# Patient Record
Sex: FEMALE | Race: WHITE | NOT HISPANIC OR LATINO
[De-identification: names, ages, dates, MRNs, and addresses within clinical notes are randomized per-mention and may not be internally consistent; named-entity substitution may affect disease eponyms.]

---

## 2018-09-21 ENCOUNTER — APPOINTMENT (OUTPATIENT)
Dept: VASCULAR SURGERY | Facility: CLINIC | Age: 53
End: 2018-09-21
Payer: SELF-PAY

## 2018-09-21 VITALS
HEART RATE: 90 BPM | OXYGEN SATURATION: 98 % | WEIGHT: 116 LBS | BODY MASS INDEX: 19.33 KG/M2 | SYSTOLIC BLOOD PRESSURE: 133 MMHG | DIASTOLIC BLOOD PRESSURE: 84 MMHG | HEIGHT: 65 IN

## 2018-09-21 PROCEDURE — 99243 OFF/OP CNSLTJ NEW/EST LOW 30: CPT

## 2018-10-22 ENCOUNTER — APPOINTMENT (OUTPATIENT)
Dept: NEUROLOGY | Facility: CLINIC | Age: 53
End: 2018-10-22
Payer: COMMERCIAL

## 2018-10-22 VITALS
DIASTOLIC BLOOD PRESSURE: 84 MMHG | HEART RATE: 104 BPM | BODY MASS INDEX: 19.33 KG/M2 | TEMPERATURE: 97.9 F | SYSTOLIC BLOOD PRESSURE: 126 MMHG | WEIGHT: 116 LBS | HEIGHT: 65 IN | OXYGEN SATURATION: 100 %

## 2018-10-22 DIAGNOSIS — F17.200 NICOTINE DEPENDENCE, UNSPECIFIED, UNCOMPLICATED: ICD-10-CM

## 2018-10-22 DIAGNOSIS — Z87.448 PERSONAL HISTORY OF OTHER DISEASES OF URINARY SYSTEM: ICD-10-CM

## 2018-10-22 DIAGNOSIS — Z82.49 FAMILY HISTORY OF ISCHEMIC HEART DISEASE AND OTHER DISEASES OF THE CIRCULATORY SYSTEM: ICD-10-CM

## 2018-10-22 DIAGNOSIS — Z78.9 OTHER SPECIFIED HEALTH STATUS: ICD-10-CM

## 2018-10-22 DIAGNOSIS — Z87.19 PERSONAL HISTORY OF OTHER DISEASES OF THE DIGESTIVE SYSTEM: ICD-10-CM

## 2018-10-22 DIAGNOSIS — Z86.69 PERSONAL HISTORY OF OTHER DISEASES OF THE NERVOUS SYSTEM AND SENSE ORGANS: ICD-10-CM

## 2018-10-22 DIAGNOSIS — I51.9 HEART DISEASE, UNSPECIFIED: ICD-10-CM

## 2018-10-22 DIAGNOSIS — Z82.61 FAMILY HISTORY OF ARTHRITIS: ICD-10-CM

## 2018-10-22 DIAGNOSIS — Z82.5 FAMILY HISTORY OF ASTHMA AND OTHER CHRONIC LOWER RESPIRATORY DISEASES: ICD-10-CM

## 2018-10-22 DIAGNOSIS — Z84.1 FAMILY HISTORY OF DISORDERS OF KIDNEY AND URETER: ICD-10-CM

## 2018-10-22 DIAGNOSIS — Z98.891 HISTORY OF UTERINE SCAR FROM PREVIOUS SURGERY: ICD-10-CM

## 2018-10-22 DIAGNOSIS — F17.210 NICOTINE DEPENDENCE, CIGARETTES, UNCOMPLICATED: ICD-10-CM

## 2018-10-22 DIAGNOSIS — Z83.3 FAMILY HISTORY OF DIABETES MELLITUS: ICD-10-CM

## 2018-10-22 DIAGNOSIS — M79.606 PAIN IN LEG, UNSPECIFIED: ICD-10-CM

## 2018-10-22 DIAGNOSIS — M23.309 OTHER MENISCUS DERANGEMENTS, UNSPECIFIED MENISCUS, UNSPECIFIED KNEE: ICD-10-CM

## 2018-10-22 PROCEDURE — 99205 OFFICE O/P NEW HI 60 MIN: CPT

## 2018-10-23 LAB
ALBUMIN SERPL ELPH-MCNC: 4.5 G/DL
ALP BLD-CCNC: 66 U/L
ALT SERPL-CCNC: 18 U/L
ANION GAP SERPL CALC-SCNC: 13 MMOL/L
AST SERPL-CCNC: 24 U/L
BASOPHILS # BLD AUTO: 0.02 K/UL
BASOPHILS NFR BLD AUTO: 0.3 %
BILIRUB SERPL-MCNC: 0.3 MG/DL
BUN SERPL-MCNC: 8 MG/DL
CALCIUM SERPL-MCNC: 9.7 MG/DL
CHLORIDE SERPL-SCNC: 106 MMOL/L
CHOLEST SERPL-MCNC: 193 MG/DL
CHOLEST/HDLC SERPL: 2.1 RATIO
CO2 SERPL-SCNC: 25 MMOL/L
CREAT SERPL-MCNC: 0.63 MG/DL
CRP SERPL-MCNC: 0.82 MG/DL
EOSINOPHIL # BLD AUTO: 0.3 K/UL
EOSINOPHIL NFR BLD AUTO: 4.5 %
ERYTHROCYTE [SEDIMENTATION RATE] IN BLOOD BY WESTERGREN METHOD: 18 MM/HR
ESTIMATED AVERAGE GLUCOSE: 105 MG/DL
FOLATE SERPL-MCNC: 7.3 NG/ML
GLIADIN IGA SER QL: <5 UNITS
GLIADIN IGG SER QL: <5 UNITS
GLIADIN PEPTIDE IGA SER-ACNC: NEGATIVE
GLIADIN PEPTIDE IGG SER-ACNC: NEGATIVE
GLUCOSE SERPL-MCNC: 123 MG/DL
HBA1C MFR BLD HPLC: 5.3 %
HCT VFR BLD CALC: 43.3 %
HCV AB SER QL: NONREACTIVE
HCV S/CO RATIO: 0.2 S/CO
HDLC SERPL-MCNC: 90 MG/DL
HGB BLD-MCNC: 14.3 G/DL
HIV1+2 AB SPEC QL IA.RAPID: NONREACTIVE
IMM GRANULOCYTES NFR BLD AUTO: 0.3 %
LDLC SERPL CALC-MCNC: 88 MG/DL
LYMPHOCYTES # BLD AUTO: 1.83 K/UL
LYMPHOCYTES NFR BLD AUTO: 27.6 %
MAN DIFF?: NORMAL
MCHC RBC-ENTMCNC: 33 GM/DL
MCHC RBC-ENTMCNC: 33.9 PG
MCV RBC AUTO: 102.6 FL
MONOCYTES # BLD AUTO: 0.56 K/UL
MONOCYTES NFR BLD AUTO: 8.5 %
NEUTROPHILS # BLD AUTO: 3.89 K/UL
NEUTROPHILS NFR BLD AUTO: 58.8 %
PLATELET # BLD AUTO: 337 K/UL
POTASSIUM SERPL-SCNC: 3.9 MMOL/L
PROT SERPL-MCNC: 7.2 G/DL
RBC # BLD: 4.22 M/UL
RBC # FLD: 13.6 %
RHEUMATOID FACT SER QL: <10 IU/ML
SODIUM SERPL-SCNC: 144 MMOL/L
TRIGL SERPL-MCNC: 73 MG/DL
TSH SERPL-ACNC: 1.02 UIU/ML
VIT B12 SERPL-MCNC: 795 PG/ML
WBC # FLD AUTO: 6.62 K/UL

## 2018-10-24 LAB
ACE BLD-CCNC: 33 U/L
ANA SER IF-ACNC: NEGATIVE
ENA RNP AB SER IA-ACNC: 0.3 AL
ENA SM AB SER IA-ACNC: <0.2 AL
ENA SS-A AB SER IA-ACNC: <0.2 AL
ENA SS-B AB SER IA-ACNC: <0.2 AL
TTG IGA SER IA-ACNC: <5 UNITS
TTG IGA SER-ACNC: NEGATIVE
TTG IGG SER IA-ACNC: <5 UNITS
TTG IGG SER IA-ACNC: NEGATIVE

## 2018-10-26 LAB
CRYOGLOB SERPL-MCNC: NEGATIVE
METHYLMALONATE SERPL-SCNC: 181 NMOL/L
VIT B1 SERPL-MCNC: 110.8 NMOL/L

## 2018-11-05 LAB — HU AB SER QL: NORMAL

## 2019-01-11 ENCOUNTER — APPOINTMENT (OUTPATIENT)
Dept: NEUROLOGY | Facility: CLINIC | Age: 54
End: 2019-01-11
Payer: COMMERCIAL

## 2019-01-11 VITALS — OXYGEN SATURATION: 99 % | SYSTOLIC BLOOD PRESSURE: 124 MMHG | HEART RATE: 92 BPM | DIASTOLIC BLOOD PRESSURE: 86 MMHG

## 2019-01-11 VITALS — HEIGHT: 65 IN | WEIGHT: 120 LBS | BODY MASS INDEX: 19.99 KG/M2

## 2019-01-11 PROCEDURE — 95912 NRV CNDJ TEST 11-12 STUDIES: CPT

## 2019-01-11 PROCEDURE — 99214 OFFICE O/P EST MOD 30 MIN: CPT | Mod: 25

## 2019-01-11 PROCEDURE — 95885 MUSC TST DONE W/NERV TST LIM: CPT | Mod: 59

## 2019-01-11 RX ORDER — DOCUSATE SODIUM - SENNOSIDES 50; 8.6 MG/1; MG/1
8.6-5 TABLET, FILM COATED ORAL
Refills: 0 | Status: ACTIVE | COMMUNITY

## 2019-01-11 RX ORDER — CIMETIDINE 200 MG
200 TABLET ORAL
Refills: 0 | Status: ACTIVE | COMMUNITY

## 2019-01-11 NOTE — PHYSICAL EXAM
[General Appearance - Alert] : alert [General Appearance - In No Acute Distress] : in no acute distress [Oriented To Time, Place, And Person] : oriented to person, place, and time [Impaired Insight] : insight and judgment were intact [Affect] : the affect was normal [Concentration Intact] : normal concentrating ability [Fluency] : fluency intact [Comprehension] : comprehension intact [Past History] : adequate knowledge of personal past history [Vocabulary] : adequate range of vocabulary [Cranial Nerves Optic (II)] : visual acuity intact bilaterally,  visual fields full to confrontation, pupils equal round and reactive to light [Cranial Nerves Oculomotor (III)] : extraocular motion intact [Cranial Nerves Trigeminal (V)] : facial sensation intact symmetrically [Cranial Nerves Facial (VII)] : face symmetrical [Cranial Nerves Vestibulocochlear (VIII)] : hearing was intact bilaterally [Cranial Nerves Glossopharyngeal (IX)] : tongue and palate midline [Cranial Nerves Accessory (XI - Cranial And Spinal)] : head turning and shoulder shrug symmetric [Cranial Nerves Hypoglossal (XII)] : there was no tongue deviation with protrusion [Motor Tone] : muscle tone was normal in all four extremities [Motor Strength] : muscle strength was normal in all four extremities [Involuntary Movements] : no involuntary movements were seen [No Muscle Atrophy] : normal bulk in all four extremities [Sensation Vibration Decrease] : vibration was intact [Proprioception] : proprioception was intact [Abnormal Walk] : normal gait [Balance] : balance was intact [Past-pointing] : there was no past-pointing [Tremor] : no tremor present [Coordination - Dysmetria Impaired Finger-to-Nose Bilateral] : not present [2+] : Ankle jerk left 2+ [FreeTextEntry7] : diminished cold sensation in length-dependent manner in UE and LE b/l; decreased sensation right anterior knee [FreeTextEntry1] : blanching erythema feet > hands b/l, cold to touch

## 2019-01-11 NOTE — HISTORY OF PRESENT ILLNESS
[FreeTextEntry1] : Symptoms are largely unchanged. SHe is trying to go off gralise to see if pain remains stable. She again reports cold and red hands and feet prior to surgery, but thinks it got somewhat worse after. Surgery was 1 year ago.

## 2019-01-11 NOTE — PROCEDURE
[FreeTextEntry1] : Nerve Conduction and Electromyography Report [FreeTextEntry3] : Electro Physiologic Findings:\par \par The superficial fibular SNAPs were normal, with borderline velocities likely related to the feet being slightly cold. The sural SNAPs were normal bilaterally and symmetric. The left saphenous SNAP was present while the right was absent. The fibular and tibial motor responses, including F-wave latencies, were normal bilaterally and largely symmetric. The tibial H-reflexes were normal and symmetric. \par \par Needle electromyography was performed on the bilateral gastrocnemius, tibialis anterior, and S1 radiculopathies. There was mild active denervation in the left S1 radiculopathy; the other muscles tested were unremarkable. \par \par Clinical Electrophysiological Impression: \par \par This electrodiagnostic study demonstrated a mild left S1 radiculopathy and right saphenous neuropathy. There was no evidence of polyneuropathy or fibular nerve entrapment on this study.

## 2019-01-11 NOTE — ASSESSMENT
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 \par \par At this point i believe she had either a pre-existing small fiber neuropathy or erythromelalgia prior to surgery and then developed a CRPS type exacerbation. \par \par Trial aspirin 81mg BID for possible erythromelalgia. Can taper gralise, if pain gets worse go back on. Return in 6-8 weeks. \par \par See separate procedure note for full results of study.

## 2019-03-14 ENCOUNTER — APPOINTMENT (OUTPATIENT)
Dept: NEUROLOGY | Facility: CLINIC | Age: 54
End: 2019-03-14
Payer: COMMERCIAL

## 2019-03-14 VITALS
WEIGHT: 118 LBS | OXYGEN SATURATION: 97 % | HEART RATE: 93 BPM | SYSTOLIC BLOOD PRESSURE: 133 MMHG | DIASTOLIC BLOOD PRESSURE: 83 MMHG | HEIGHT: 65 IN | BODY MASS INDEX: 19.66 KG/M2

## 2019-03-14 PROCEDURE — 99214 OFFICE O/P EST MOD 30 MIN: CPT

## 2019-03-14 NOTE — HISTORY OF PRESENT ILLNESS
[FreeTextEntry1] : Since last visit no significant change in symptoms. Her back is bothering her a bit more, she thinks because she's stopped taking celebrex. Aspirin hasn't helped much. She is still not taking gralise.  Aspirin has not helpe \par She also feels tightness and swelling in her hands, she thinks in the joints but is not sure. She has seen a rheumatologist in the past without clear diagnosis. \par Mother has lupus.

## 2019-03-14 NOTE — PHYSICAL EXAM
[General Appearance - Alert] : alert [General Appearance - In No Acute Distress] : in no acute distress [Oriented To Time, Place, And Person] : oriented to person, place, and time [Impaired Insight] : insight and judgment were intact [Affect] : the affect was normal [Concentration Intact] : normal concentrating ability [Fluency] : fluency intact [Comprehension] : comprehension intact [Past History] : adequate knowledge of personal past history [Vocabulary] : adequate range of vocabulary [Cranial Nerves Optic (II)] : visual acuity intact bilaterally,  visual fields full to confrontation, pupils equal round and reactive to light [Cranial Nerves Oculomotor (III)] : extraocular motion intact [Cranial Nerves Trigeminal (V)] : facial sensation intact symmetrically [Cranial Nerves Facial (VII)] : face symmetrical [Cranial Nerves Vestibulocochlear (VIII)] : hearing was intact bilaterally [Cranial Nerves Glossopharyngeal (IX)] : tongue and palate midline [Cranial Nerves Accessory (XI - Cranial And Spinal)] : head turning and shoulder shrug symmetric [Cranial Nerves Hypoglossal (XII)] : there was no tongue deviation with protrusion [Motor Tone] : muscle tone was normal in all four extremities [Motor Strength] : muscle strength was normal in all four extremities [Involuntary Movements] : no involuntary movements were seen [No Muscle Atrophy] : normal bulk in all four extremities [Sensation Vibration Decrease] : vibration was intact [Proprioception] : proprioception was intact [Abnormal Walk] : normal gait [Balance] : balance was intact [2+] : Ankle jerk left 2+ [Past-pointing] : there was no past-pointing [Tremor] : no tremor present [Coordination - Dysmetria Impaired Finger-to-Nose Bilateral] : not present [FreeTextEntry7] : diminished cold sensation in length-dependent manner in UE and LE b/l; decreased sensation right anterior knee [FreeTextEntry1] : blanching erythema feet > hands b/l, cold to touch

## 2019-03-14 NOTE — ASSESSMENT
[FreeTextEntry1] : Trial of verapamil for vasomotor dysfunction from small fiber neuropathy\par Discussed skin biopsy but not likely to be helpful at this time - deferred\par Stop aspirin\par Return in 2-3 months

## 2019-06-06 ENCOUNTER — TRANSCRIPTION ENCOUNTER (OUTPATIENT)
Age: 54
End: 2019-06-06

## 2019-06-06 ENCOUNTER — APPOINTMENT (OUTPATIENT)
Dept: NEUROLOGY | Facility: CLINIC | Age: 54
End: 2019-06-06
Payer: COMMERCIAL

## 2019-06-06 VITALS
WEIGHT: 121 LBS | TEMPERATURE: 98.2 F | HEART RATE: 91 BPM | HEIGHT: 65 IN | SYSTOLIC BLOOD PRESSURE: 143 MMHG | DIASTOLIC BLOOD PRESSURE: 86 MMHG | BODY MASS INDEX: 20.16 KG/M2 | OXYGEN SATURATION: 97 %

## 2019-06-06 VITALS — DIASTOLIC BLOOD PRESSURE: 84 MMHG | SYSTOLIC BLOOD PRESSURE: 138 MMHG

## 2019-06-06 DIAGNOSIS — G57.81 OTHER SPECIFIED MONONEUROPATHIES OF RIGHT LOWER LIMB: ICD-10-CM

## 2019-06-06 PROCEDURE — 99214 OFFICE O/P EST MOD 30 MIN: CPT

## 2019-06-06 RX ORDER — ASPIRIN 81 MG
81 TABLET,CHEWABLE ORAL
Refills: 0 | Status: DISCONTINUED | COMMUNITY
End: 2019-06-06

## 2019-06-06 NOTE — HISTORY OF PRESENT ILLNESS
[FreeTextEntry1] : She thinks that her symptoms have been better in the past couple of months since she started verapamil, although she is not 100% sure, and she believes it is causing a headache. \par \par 10 pt review of systems performed and reviewed with patient\par Past medical history, surgical history, social history, and family history reviewed with patient\par See scanned document for details\par

## 2019-06-06 NOTE — ASSESSMENT
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

## 2019-06-06 NOTE — PHYSICAL EXAM
[General Appearance - Alert] : alert [General Appearance - In No Acute Distress] : in no acute distress [Oriented To Time, Place, And Person] : oriented to person, place, and time [Impaired Insight] : insight and judgment were intact [Affect] : the affect was normal [Fluency] : fluency intact [Concentration Intact] : normal concentrating ability [Comprehension] : comprehension intact [Past History] : adequate knowledge of personal past history [Vocabulary] : adequate range of vocabulary [Cranial Nerves Oculomotor (III)] : extraocular motion intact [Cranial Nerves Trigeminal (V)] : facial sensation intact symmetrically [Cranial Nerves Optic (II)] : visual acuity intact bilaterally,  visual fields full to confrontation, pupils equal round and reactive to light [Cranial Nerves Facial (VII)] : face symmetrical [Cranial Nerves Vestibulocochlear (VIII)] : hearing was intact bilaterally [Cranial Nerves Glossopharyngeal (IX)] : tongue and palate midline [Cranial Nerves Hypoglossal (XII)] : there was no tongue deviation with protrusion [Cranial Nerves Accessory (XI - Cranial And Spinal)] : head turning and shoulder shrug symmetric [Motor Tone] : muscle tone was normal in all four extremities [Motor Strength] : muscle strength was normal in all four extremities [No Muscle Atrophy] : normal bulk in all four extremities [Involuntary Movements] : no involuntary movements were seen [Sensation Vibration Decrease] : vibration was intact [Proprioception] : proprioception was intact [Abnormal Walk] : normal gait [Balance] : balance was intact [2+] : Ankle jerk left 2+ [Past-pointing] : there was no past-pointing [Tremor] : no tremor present [Coordination - Dysmetria Impaired Finger-to-Nose Bilateral] : not present [FreeTextEntry7] : diminished cold sensation in length-dependent manner in UE and LE b/l; decreased sensation right anterior knee [FreeTextEntry1] : blanching erythema feet > hands b/l, cold to touch

## 2019-10-15 ENCOUNTER — APPOINTMENT (OUTPATIENT)
Dept: NEUROLOGY | Facility: CLINIC | Age: 54
End: 2019-10-15
Payer: COMMERCIAL

## 2019-10-15 VITALS
HEIGHT: 65 IN | SYSTOLIC BLOOD PRESSURE: 133 MMHG | HEART RATE: 93 BPM | DIASTOLIC BLOOD PRESSURE: 84 MMHG | WEIGHT: 124 LBS | OXYGEN SATURATION: 97 % | TEMPERATURE: 97.9 F | BODY MASS INDEX: 20.66 KG/M2

## 2019-10-15 LAB
CRP SERPL-MCNC: <0.1 MG/DL
RHEUMATOID FACT SER QL: <10 IU/ML

## 2019-10-15 PROCEDURE — 99214 OFFICE O/P EST MOD 30 MIN: CPT

## 2019-10-16 LAB
ALBUMIN MFR SERPL ELPH: 64.9 %
ALBUMIN SERPL-MCNC: 4.5 G/DL
ALBUMIN/GLOB SERPL: 1.8 RATIO
ALPHA1 GLOB MFR SERPL ELPH: 4.3 %
ALPHA1 GLOB SERPL ELPH-MCNC: 0.3 G/DL
ALPHA2 GLOB MFR SERPL ELPH: 10.2 %
ALPHA2 GLOB SERPL ELPH-MCNC: 0.7 G/DL
ANA SER IF-ACNC: NEGATIVE
B-GLOBULIN MFR SERPL ELPH: 10.4 %
B-GLOBULIN SERPL ELPH-MCNC: 0.7 G/DL
CCP AB SER IA-ACNC: <8 UNITS
DSDNA AB SER-ACNC: <12 IU/ML
ENA RNP AB SER IA-ACNC: 0.4 AL
ENA SM AB SER IA-ACNC: <0.2 AL
ENA SS-A AB SER IA-ACNC: <0.2 AL
ENA SS-B AB SER IA-ACNC: <0.2 AL
ERYTHROCYTE [SEDIMENTATION RATE] IN BLOOD BY WESTERGREN METHOD: 9 MM/HR
GAMMA GLOB FLD ELPH-MCNC: 0.7 G/DL
GAMMA GLOB MFR SERPL ELPH: 10.2 %
INTERPRETATION SERPL IEP-IMP: NORMAL
M PROTEIN SPEC IFE-MCNC: NORMAL
PROT SERPL-MCNC: 7 G/DL
PROT SERPL-MCNC: 7 G/DL
RF+CCP IGG SER-IMP: NEGATIVE

## 2019-10-16 NOTE — ASSESSMENT
[FreeTextEntry1] : Repeat autoimmune / inflammatory markers today - discussed\par Recommend she take verapamil regularly for 1-2 weeks to see if there is any benefit, if not she can stop it - discussed\par Return in 6 months

## 2019-10-16 NOTE — PHYSICAL EXAM
[General Appearance - In No Acute Distress] : in no acute distress [General Appearance - Alert] : alert [Oriented To Time, Place, And Person] : oriented to person, place, and time [Impaired Insight] : insight and judgment were intact [Concentration Intact] : normal concentrating ability [Affect] : the affect was normal [Fluency] : fluency intact [Comprehension] : comprehension intact [Past History] : adequate knowledge of personal past history [Cranial Nerves Optic (II)] : visual acuity intact bilaterally,  visual fields full to confrontation, pupils equal round and reactive to light [Cranial Nerves Oculomotor (III)] : extraocular motion intact [Vocabulary] : adequate range of vocabulary [Cranial Nerves Facial (VII)] : face symmetrical [Cranial Nerves Trigeminal (V)] : facial sensation intact symmetrically [Cranial Nerves Glossopharyngeal (IX)] : tongue and palate midline [Cranial Nerves Vestibulocochlear (VIII)] : hearing was intact bilaterally [Cranial Nerves Accessory (XI - Cranial And Spinal)] : head turning and shoulder shrug symmetric [Cranial Nerves Hypoglossal (XII)] : there was no tongue deviation with protrusion [Involuntary Movements] : no involuntary movements were seen [Motor Strength] : muscle strength was normal in all four extremities [Motor Tone] : muscle tone was normal in all four extremities [No Muscle Atrophy] : normal bulk in all four extremities [Sensation Vibration Decrease] : vibration was intact [Proprioception] : proprioception was intact [Abnormal Walk] : normal gait [Balance] : balance was intact [2+] : Ankle jerk left 2+ [Past-pointing] : there was no past-pointing [Tremor] : no tremor present [Coordination - Dysmetria Impaired Finger-to-Nose Bilateral] : not present [FreeTextEntry7] : diminished cold sensation in length-dependent manner in UE and LE b/l; decreased sensation right anterior knee [FreeTextEntry1] : blanching erythema feet > hands b/l, cold to touch

## 2019-10-16 NOTE — HISTORY OF PRESENT ILLNESS
[FreeTextEntry1] : Last seen 4 months ago\par Symptoms are about the same - not taking verapamil consistently \par Reviewed records she brought - RF and CCP negative in 2017\par She reports testing positive for lyme disease in the past, but when these symptoms started in 2014 she went to [facility] and was told she did not have it.

## 2019-11-11 ENCOUNTER — TRANSCRIPTION ENCOUNTER (OUTPATIENT)
Age: 54
End: 2019-11-11

## 2019-11-11 ENCOUNTER — RX RENEWAL (OUTPATIENT)
Age: 54
End: 2019-11-11

## 2019-11-22 ENCOUNTER — MEDICATION RENEWAL (OUTPATIENT)
Age: 54
End: 2019-11-22

## 2020-02-28 ENCOUNTER — TRANSCRIPTION ENCOUNTER (OUTPATIENT)
Age: 55
End: 2020-02-28

## 2020-03-19 ENCOUNTER — TRANSCRIPTION ENCOUNTER (OUTPATIENT)
Age: 55
End: 2020-03-19

## 2020-04-14 ENCOUNTER — APPOINTMENT (OUTPATIENT)
Dept: NEUROLOGY | Facility: CLINIC | Age: 55
End: 2020-04-14
Payer: COMMERCIAL

## 2020-04-14 DIAGNOSIS — M54.17 RADICULOPATHY, LUMBOSACRAL REGION: ICD-10-CM

## 2020-04-14 DIAGNOSIS — L53.9 ERYTHEMATOUS CONDITION, UNSPECIFIED: ICD-10-CM

## 2020-04-14 PROCEDURE — 99214 OFFICE O/P EST MOD 30 MIN: CPT | Mod: 95

## 2020-04-14 NOTE — ASSESSMENT
[FreeTextEntry1] : She may have had COVID but with a false negative test - can get serology testing when it is available\par Continue verapamil\par Left elbow brace at night\par Return for EMG - will look for ulnar entrapment and radiculoapthy

## 2020-04-14 NOTE — HISTORY OF PRESENT ILLNESS
[Home] : at home, [unfilled] , at the time of the visit. [Medical Office: ([facility])___] : at the medical office located in  [Patient] : the patient [Self] : self [FreeTextEntry1] : She tested negative for COVID 19, also negative for RVP\par She continued to have fever until a few days \par She had a "weird taste" in her mouth for a couple of weeks, as well as chest pressure\par \par She still has tingling in the left 4th and 5th fingers on the left - better after changing her sleeping position and keeping her arms straight \par She is going to have an MRI of the right elbow next week \par \par She's still taking verapamil - she thinks it does help b/c pain is worse when she doesn't take it \par \par She also complains of pain behind the left knee going down to the bottom of the left foot

## 2020-05-04 ENCOUNTER — TRANSCRIPTION ENCOUNTER (OUTPATIENT)
Age: 55
End: 2020-05-04

## 2020-05-21 ENCOUNTER — APPOINTMENT (OUTPATIENT)
Dept: NEUROLOGY | Facility: CLINIC | Age: 55
End: 2020-05-21
Payer: COMMERCIAL

## 2020-05-21 VITALS
TEMPERATURE: 97.9 F | OXYGEN SATURATION: 97 % | HEART RATE: 87 BPM | SYSTOLIC BLOOD PRESSURE: 151 MMHG | DIASTOLIC BLOOD PRESSURE: 93 MMHG

## 2020-05-21 VITALS — WEIGHT: 120 LBS | BODY MASS INDEX: 19.99 KG/M2 | HEIGHT: 65 IN

## 2020-05-21 VITALS — SYSTOLIC BLOOD PRESSURE: 150 MMHG | DIASTOLIC BLOOD PRESSURE: 88 MMHG

## 2020-05-21 DIAGNOSIS — G56.22 LESION OF ULNAR NERVE, LEFT UPPER LIMB: ICD-10-CM

## 2020-05-21 DIAGNOSIS — G57.02 LESION OF SCIATIC NERVE, LEFT LOWER LIMB: ICD-10-CM

## 2020-05-21 PROCEDURE — 95913 NRV CNDJ TEST 13/> STUDIES: CPT

## 2020-05-21 PROCEDURE — 95885 MUSC TST DONE W/NERV TST LIM: CPT

## 2020-05-21 PROCEDURE — 99214 OFFICE O/P EST MOD 30 MIN: CPT

## 2020-05-23 NOTE — PROCEDURE
[FreeTextEntry1] : Nerve Conduction and Electromyography Report [FreeTextEntry3] : Electro Physiologic Findings:  Limb temperature was monitored and maintained at approximately 30 – 34° C in the lower extremities, and 32 – 36° C in the upper extremities.  The superficial fibular SNAP amplitudes were normal bilaterally and symmetric; there was mild slowing on the left. The left sural SNAP amplitude was about 30% that of the right side with normal velocity bilaterally. The left ulnar SNAP amplitude was just over 50% that of the right side. The median SNAPS were normal bilaterally and symmetric.   The left fibular and tibial CMAP amplitudes were about 50% that of the right side, and there was mild slowing of the left fibular nerve across the fibular head without conduction block. The left ulnar motor response was mildly slow across the elbow without conduction block; the CMAP amplitudes were normal and symmetric. The median motor responses were also normal and symmetric. The bilateral fibular, tibial, median and ulnar F-wave latencies were normal and symmetric.   Needle electromyography was performed on select left lower extremity L4-S2 appendicular muscles and the left L4/5 and L5/S1 paraspinals. There was a mildly neurogenic firing pattern in the left biceps femoris; otherwise all muscles tested were normal without evidence of active or chronic denervation.   Clinical Electrophysiological Impression:    This electrodiagnostic study demonstrated a mild left ulnar nerve entrapment at the elbow with mild distal sensory axon loss, consistent with the patient’s symptoms of numbness/tingling down the medial left forearm to the 4th and 5th fingers.   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 However there was no definite denervation in any of the sciatic nerve-innervated muscles that were tested. An asymmetric polyneuropathy is also possible, but less likely given the distribution of the patient’s symptoms.

## 2020-05-24 ENCOUNTER — TRANSCRIPTION ENCOUNTER (OUTPATIENT)
Age: 55
End: 2020-05-24

## 2020-05-24 PROBLEM — G56.22 ENTRAPMENT OF LEFT ULNAR NERVE: Status: ACTIVE | Noted: 2020-05-04

## 2020-05-24 PROBLEM — G57.02 NEUROPATHY OF LEFT SCIATIC NERVE: Status: ACTIVE | Noted: 2020-05-24

## 2020-05-24 NOTE — PHYSICAL EXAM
[General Appearance - Alert] : alert [General Appearance - In No Acute Distress] : in no acute distress [Oriented To Time, Place, And Person] : oriented to person, place, and time [Impaired Insight] : insight and judgment were intact [Affect] : the affect was normal [Concentration Intact] : normal concentrating ability [Fluency] : fluency intact [Comprehension] : comprehension intact [Past History] : adequate knowledge of personal past history [Vocabulary] : adequate range of vocabulary [Cranial Nerves Optic (II)] : visual acuity intact bilaterally,  visual fields full to confrontation, pupils equal round and reactive to light [Cranial Nerves Oculomotor (III)] : extraocular motion intact [Cranial Nerves Trigeminal (V)] : facial sensation intact symmetrically [Cranial Nerves Facial (VII)] : face symmetrical [Cranial Nerves Vestibulocochlear (VIII)] : hearing was intact bilaterally [Cranial Nerves Glossopharyngeal (IX)] : tongue and palate midline [Cranial Nerves Accessory (XI - Cranial And Spinal)] : head turning and shoulder shrug symmetric [Cranial Nerves Hypoglossal (XII)] : there was no tongue deviation with protrusion [Motor Tone] : muscle tone was normal in all four extremities [Motor Strength] : muscle strength was normal in all four extremities [No Muscle Atrophy] : normal bulk in all four extremities [Involuntary Movements] : no involuntary movements were seen [Sensation Vibration Decrease] : vibration was intact [Proprioception] : proprioception was intact [Abnormal Walk] : normal gait [Balance] : balance was intact [Past-pointing] : there was no past-pointing [Tremor] : no tremor present [Coordination - Dysmetria Impaired Finger-to-Nose Bilateral] : not present [2+] : Ankle jerk left 2+ [FreeTextEntry7] : diminished cold sensation in length-dependent manner in UE and LE b/l; decreased sensation right anterior knee [FreeTextEntry1] : 1+ DP pulses b/l

## 2020-05-24 NOTE — HISTORY OF PRESENT ILLNESS
[FreeTextEntry1] : Symptoms unchanged - still has tingling down left arm to 4th and 5th fingers\par Also pain in left gluteal / upper posterior thigh region radiating down the leg to the heel \par Feet are getting more red, may be warming weather\par Thinks verapamil is helping

## 2020-05-24 NOTE — ASSESSMENT
[FreeTextEntry1] : NCS/EMG consistent with mild left ulnar nerve entrapment at elbow, and partial left sciatic neuropathy vs. asymmetric polyneuropathy (less likely)\par Recommend regular nocturnal elbow bracing. Will arrange for MRI of left sciatic nerve from gluteal region down to knee.\par \par See separate procedure note for full results of study.

## 2020-05-24 NOTE — PHYSICAL EXAM
[General Appearance - Alert] : alert [General Appearance - In No Acute Distress] : in no acute distress [Oriented To Time, Place, And Person] : oriented to person, place, and time [Impaired Insight] : insight and judgment were intact [Affect] : the affect was normal [Concentration Intact] : normal concentrating ability [Fluency] : fluency intact [Comprehension] : comprehension intact [Past History] : adequate knowledge of personal past history [Vocabulary] : adequate range of vocabulary [Cranial Nerves Optic (II)] : visual acuity intact bilaterally,  visual fields full to confrontation, pupils equal round and reactive to light [Cranial Nerves Oculomotor (III)] : extraocular motion intact [Cranial Nerves Trigeminal (V)] : facial sensation intact symmetrically [Cranial Nerves Facial (VII)] : face symmetrical [Cranial Nerves Vestibulocochlear (VIII)] : hearing was intact bilaterally [Cranial Nerves Glossopharyngeal (IX)] : tongue and palate midline [Cranial Nerves Accessory (XI - Cranial And Spinal)] : head turning and shoulder shrug symmetric [Cranial Nerves Hypoglossal (XII)] : there was no tongue deviation with protrusion [Motor Tone] : muscle tone was normal in all four extremities [Motor Strength] : muscle strength was normal in all four extremities [Involuntary Movements] : no involuntary movements were seen [No Muscle Atrophy] : normal bulk in all four extremities [Sensation Vibration Decrease] : vibration was intact [Proprioception] : proprioception was intact [Abnormal Walk] : normal gait [Balance] : balance was intact [Past-pointing] : there was no past-pointing [Tremor] : no tremor present [Coordination - Dysmetria Impaired Finger-to-Nose Bilateral] : not present [FreeTextEntry7] : diminished cold sensation in length-dependent manner in UE and LE b/l; decreased sensation right anterior knee [2+] : Ankle jerk left 2+ [FreeTextEntry1] : 1+ DP pulses b/l

## 2020-06-01 ENCOUNTER — TRANSCRIPTION ENCOUNTER (OUTPATIENT)
Age: 55
End: 2020-06-01

## 2020-07-10 ENCOUNTER — RESULT REVIEW (OUTPATIENT)
Age: 55
End: 2020-07-10

## 2020-07-10 ENCOUNTER — OUTPATIENT (OUTPATIENT)
Dept: OUTPATIENT SERVICES | Facility: HOSPITAL | Age: 55
LOS: 1 days | End: 2020-07-10

## 2020-07-10 ENCOUNTER — APPOINTMENT (OUTPATIENT)
Dept: MRI IMAGING | Facility: CLINIC | Age: 55
End: 2020-07-10
Payer: COMMERCIAL

## 2020-07-10 PROCEDURE — 73718 MRI LOWER EXTREMITY W/O DYE: CPT | Mod: 26,LT

## 2020-11-26 ENCOUNTER — EMERGENCY (EMERGENCY)
Facility: HOSPITAL | Age: 55
LOS: 1 days | Discharge: HOME | End: 2020-11-26
Admitting: EMERGENCY MEDICINE
Payer: COMMERCIAL

## 2020-11-26 VITALS
SYSTOLIC BLOOD PRESSURE: 146 MMHG | OXYGEN SATURATION: 98 % | HEART RATE: 86 BPM | RESPIRATION RATE: 18 BRPM | TEMPERATURE: 98 F | DIASTOLIC BLOOD PRESSURE: 79 MMHG

## 2020-11-26 PROCEDURE — 12001 RPR S/N/AX/GEN/TRNK 2.5CM/<: CPT

## 2020-11-26 PROCEDURE — 99283 EMERGENCY DEPT VISIT LOW MDM: CPT | Mod: 25

## 2020-11-27 PROCEDURE — 90715 TDAP VACCINE 7 YRS/> IM: CPT

## 2020-11-27 PROCEDURE — 99283 EMERGENCY DEPT VISIT LOW MDM: CPT | Mod: 25

## 2020-11-27 PROCEDURE — 90471 IMMUNIZATION ADMIN: CPT

## 2020-11-27 PROCEDURE — 12001 RPR S/N/AX/GEN/TRNK 2.5CM/<: CPT

## 2020-11-27 RX ORDER — TETANUS TOXOID, REDUCED DIPHTHERIA TOXOID AND ACELLULAR PERTUSSIS VACCINE, ADSORBED 5; 2.5; 8; 8; 2.5 [IU]/.5ML; [IU]/.5ML; UG/.5ML; UG/.5ML; UG/.5ML
0.5 SUSPENSION INTRAMUSCULAR ONCE
Refills: 0 | Status: COMPLETED | OUTPATIENT
Start: 2020-11-27 | End: 2020-11-27

## 2020-11-27 RX ADMIN — TETANUS TOXOID, REDUCED DIPHTHERIA TOXOID AND ACELLULAR PERTUSSIS VACCINE, ADSORBED 0.5 MILLILITER(S): 5; 2.5; 8; 8; 2.5 SUSPENSION INTRAMUSCULAR at 00:50

## 2020-11-27 NOTE — ED PROVIDER NOTE - NSFOLLOWUPINSTRUCTIONS_ED_ALL_ED_FT
Keep wound covered for 48 hours. After you can wash with soap and water but DO NOT scrub.  Apply neosporin twice daily.  Return to ED in 10-12 days for suture removal.  Return to ED sooner if you experience fever, discharge from wound, difficulty moving finger, redness or streaking from wound or other concerns    Laceration    A laceration is a cut that goes through all of the layers of the skin and into the tissue that is right under the skin. Some lacerations heal on their own. Others need to be closed with skin adhesive strips, skin glue, stitches (sutures), or staples. Proper laceration care minimizes the risk of infection and helps the laceration to heal better.  If non-absorbable stitches or staples have been placed, they must be taken out within the time frame instructed by your healthcare provider.    SEEK IMMEDIATE MEDICAL CARE IF YOU HAVE ANY OF THE FOLLOWING SYMPTOMS: swelling around the wound, worsening pain, drainage from the wound, red streaking going away from your wound, inability to move finger or toe near the laceration, or discoloration of skin near the laceration.

## 2020-11-27 NOTE — ED PROVIDER NOTE - OBJECTIVE STATEMENT
55 healthy F p/w laceration to R 4th digit sustained while cutting thanksgiving turkey.  pt states she got distracted while cutting turkey and accidently sliced R 4th digit.  Immediately washed out wound and applied pressure for 1hr but still noted bleeding so came to ED.  Denies numbness/weakness, limited ROM digit, use of AC or other injuries.  Last tetanus unknown.  Denies f/c, dizziness, fainting.

## 2020-11-27 NOTE — ED PROVIDER NOTE - PATIENT PORTAL LINK FT
You can access the FollowMyHealth Patient Portal offered by [facility] by registering at the following website: [email or web address]. By joining [employer]’s FollowMyHealth portal, you will also be able to view your health information using other applications (apps) compatible with our system.

## 2020-11-27 NOTE — ED PROVIDER NOTE - PHYSICAL EXAMINATION
Vitals reviewed  Gen: well appearing, nad, speaking in full sentences  Skin: wwp  HEENT: ncat, eomi, mmm  CV: rrr, no audible m/r/g  Resp: unlabored breathing  R hand: superficial 0.5cm laceration to ulnar aspect 4th digit over PIP joint, no tendon exposure or active bleeding, FROM DIP/PIP/MCP, cap refill < 2 sec, sensation intact  Neuro: alert/oriented, no focal deficits, steady gait

## 2020-11-27 NOTE — ED PROVIDER NOTE - CLINICAL SUMMARY MEDICAL DECISION MAKING FREE TEXT BOX
55 healthy F p/w laceration to R 4th digit sustained while cutting thanksgiving turkey.  on exam R hand: superficial 0.5cm laceration to ulnar aspect 4th digit over PIP joint, no tendon exposure or active bleeding, FROM DIP/PIP/MCP, cap refill < 2 sec, sensation intact.  tetanus updated.  wound repaired without complication.  educated on wound care and return for suture removal.  discussed strict return parameters How Severe Is It?: severe Is This A New Presentation, Or A Follow-Up?: Follow Up Isotretinoin Display Cumulative Dose In The Note?: No Patient Reported Weight In Pounds (Required For Calculation): 168
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
## 2020-11-30 DIAGNOSIS — Z88.5 ALLERGY STATUS TO NARCOTIC AGENT: ICD-10-CM

## 2020-11-30 DIAGNOSIS — S61.214A LACERATION WITHOUT FOREIGN BODY OF RIGHT RING FINGER WITHOUT DAMAGE TO NAIL, INITIAL ENCOUNTER: ICD-10-CM
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
## 2020-11-30 DIAGNOSIS — Z23 ENCOUNTER FOR IMMUNIZATION: ICD-10-CM

## 2020-11-30 DIAGNOSIS — Z88.8 ALLERGY STATUS TO OTHER DRUGS, MEDICAMENTS AND BIOLOGICAL SUBSTANCES: ICD-10-CM
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
## 2021-11-30 ENCOUNTER — NON-APPOINTMENT (OUTPATIENT)
Age: 56
End: 2021-11-30

## 2021-12-01 ENCOUNTER — NON-APPOINTMENT (OUTPATIENT)
Age: 56
End: 2021-12-01

## 2021-12-01 PROBLEM — Z78.9 OTHER SPECIFIED HEALTH STATUS: Chronic | Status: ACTIVE | Noted: 2020-11-27

## 2021-12-02 ENCOUNTER — APPOINTMENT (OUTPATIENT)
Dept: NEUROLOGY | Facility: CLINIC | Age: 56
End: 2021-12-02
Payer: COMMERCIAL

## 2021-12-02 ENCOUNTER — NON-APPOINTMENT (OUTPATIENT)
Age: 56
End: 2021-12-02

## 2021-12-02 VITALS
WEIGHT: 120 LBS | BODY MASS INDEX: 23.56 KG/M2 | TEMPERATURE: 98.7 F | SYSTOLIC BLOOD PRESSURE: 155 MMHG | DIASTOLIC BLOOD PRESSURE: 87 MMHG | HEIGHT: 60 IN | OXYGEN SATURATION: 98 % | HEART RATE: 85 BPM

## 2021-12-02 DIAGNOSIS — G62.9 POLYNEUROPATHY, UNSPECIFIED: ICD-10-CM

## 2021-12-02 DIAGNOSIS — M53.3 SACROCOCCYGEAL DISORDERS, NOT ELSEWHERE CLASSIFIED: ICD-10-CM

## 2021-12-02 DIAGNOSIS — I73.81 ERYTHROMELALGIA: ICD-10-CM

## 2021-12-02 DIAGNOSIS — M25.561 PAIN IN RIGHT KNEE: ICD-10-CM

## 2021-12-02 PROCEDURE — 99214 OFFICE O/P EST MOD 30 MIN: CPT

## 2021-12-02 NOTE — HISTORY OF PRESENT ILLNESS
[FreeTextEntry1] : 55 yo F presents for follow-up due to worsening left-sided facial pain that last for a few seconds and worsened when palpating left-sided anterior neck and front of the face. She has a history of prior sinus issues intermittently. She also reports her left nostril is clogged and she sometimes uses a heena pot\par Facial pain is not sharp or stabbing, is not worse with eating / chewing, nor with hot or cold beverages\par \par She has history of costochondritis in june had a positive lyme IgM however without any symptoms\par \par She also states verapamil has not helped with erythromelalgia \par She has had mildly elevated blood pressure recently, has not started medication\par She took celebrex for costochondritis which helped; however it did not help with her knee pain which is now as bad as it was before her knee surgery \par \par Reviewed:\par Labs - CBC, CMP unremarkable; CRP normal\par Discharge summary from ER visit\par chest x-ray - unremarkable

## 2021-12-02 NOTE — ASSESSMENT
[FreeTextEntry1] : Facial pain is not consistent with trigeminal neuralgia or temporal arteritis, and CRP was normal \par It is much more consistent with sinusitis, especially given clogged nostril on that side and tender lymph node\par Recommend - flonase, ENT referral \par \par Continue verapamil - may be helping with blood pressure\par \par Restart celebrex for SI and knee pain as well as costochondritis \par Refer to pain management

## 2021-12-04 ENCOUNTER — NON-APPOINTMENT (OUTPATIENT)
Age: 56
End: 2021-12-04

## 2021-12-06 ENCOUNTER — APPOINTMENT (OUTPATIENT)
Dept: OTOLARYNGOLOGY | Facility: CLINIC | Age: 56
End: 2021-12-06
Payer: COMMERCIAL

## 2021-12-06 DIAGNOSIS — J34.89 OTHER SPECIFIED DISORDERS OF NOSE AND NASAL SINUSES: ICD-10-CM

## 2021-12-06 DIAGNOSIS — H92.02 OTALGIA, LEFT EAR: ICD-10-CM

## 2021-12-06 PROCEDURE — 31231 NASAL ENDOSCOPY DX: CPT

## 2021-12-06 PROCEDURE — 99203 OFFICE O/P NEW LOW 30 MIN: CPT | Mod: 25

## 2021-12-06 RX ORDER — AMITRIPTYLINE HYDROCHLORIDE 10 MG/1
10 TABLET, FILM COATED ORAL
Qty: 30 | Refills: 0 | Status: ACTIVE | COMMUNITY
Start: 2021-11-29

## 2021-12-06 RX ORDER — IBANDRONATE SODIUM 150 MG/1
150 TABLET ORAL
Qty: 3 | Refills: 0 | Status: ACTIVE | COMMUNITY
Start: 2021-08-05

## 2021-12-06 RX ORDER — FLUTICASONE PROPIONATE 50 UG/1
50 SPRAY, METERED NASAL
Qty: 16 | Refills: 0 | Status: ACTIVE | COMMUNITY
Start: 2021-11-29

## 2021-12-06 NOTE — CONSULT LETTER
[Dear  ___] : Dear  [unfilled], [Consult Letter:] : I had the pleasure of evaluating your patient, [unfilled]. [Please see my note below.] : Please see my note below. [Consult Closing:] : Thank you very much for allowing me to participate in the care of this patient.  If you have any questions, please do not hesitate to contact me. [Sincerely,] : Sincerely, [FreeTextEntry3] : Maria Eugenia Parham MD\par

## 2021-12-06 NOTE — ASSESSMENT
[FreeTextEntry1] : She had a several day history of left neck,facial and ear pain.  it has resolved. She also had nasal congestion and postnasal drip. On exam, her ears were normal. Nasal endoscopy and flexible laryngoscopy showed a deviated nasal septum and reflux related laryngeal changes. There was no obvious sinus infection. We discussed other possible etiologies such as TMJD, musculoskeletal pain or other types of neuralgias (such as carotidynia).  \par \par PLAN\par \par -findings and management options discussed in detail with the patient. \par -continue Flonase. \par -consider trying Breathe Right strips\par -we discussed obtaining a CT scan of the sinuses. Since her symptoms have resolved, she would like to hold off -consider repeat evaluation for TMJ dysfunction\par -consider imaging studies of the neck if the pain returns. \par -continue treatment for reflux\par -good aural hygiene \par -she is going to see a pain management specialist\par -follow up if she has recurrent symptoms or after the evaluation with the pain management specialist. \par -call and return earlier if any concerns. \par \par

## 2021-12-06 NOTE — HISTORY OF PRESENT ILLNESS
[de-identified] : MARTHA SEALS is a 56 year patient here for evaluation for sinusitis. She developed left neck, facial and ear pain last Sunday. She said that there was a trigger point in her neck- when she touched it she had pain radiating to her face and ear. She has left nasal congestion, obstruction and postnasal drip. She saw her PCP who thought she could have trigeminal neuralgia. She then saw her dentist who did not find a dental source. She saw Dr. Whitlock her neurologist who did not feel it was due to trigeminal neuralgia or temporal arteritis. There was concern for a sinus infection because of the congestion.  Her symptoms resolved on Thursday. She currently feels fine.  She is using Flonase. I was able to review the note from Dr. Whitlock and her prior MRI scan of the spine results.   \par \par She has seen an ENT in the past for her sinuses. She has no history of sinus surgery\par She has a history of TMJD. She had upper and lower nightguards but has not worn them recently. She also had PT for it \par She has a history of reflux. She is on medication\par She has a history of migraines in the past. \par She has tested positive for Lyme disease

## 2021-12-22 ENCOUNTER — NON-APPOINTMENT (OUTPATIENT)
Age: 56
End: 2021-12-22

## 2021-12-23 ENCOUNTER — NON-APPOINTMENT (OUTPATIENT)
Age: 56
End: 2021-12-23

## 2022-01-03 ENCOUNTER — RESULT REVIEW (OUTPATIENT)
Age: 57
End: 2022-01-03

## 2022-01-03 DIAGNOSIS — M48.062 SPINAL STENOSIS, LUMBAR REGION WITH NEUROGENIC CLAUDICATION: ICD-10-CM

## 2022-01-06 ENCOUNTER — APPOINTMENT (OUTPATIENT)
Dept: SPINE | Facility: CLINIC | Age: 57
End: 2022-01-06
Payer: COMMERCIAL

## 2022-01-06 ENCOUNTER — OUTPATIENT (OUTPATIENT)
Dept: OUTPATIENT SERVICES | Facility: HOSPITAL | Age: 57
LOS: 1 days | End: 2022-01-06
Payer: COMMERCIAL

## 2022-01-06 VITALS
HEART RATE: 82 BPM | SYSTOLIC BLOOD PRESSURE: 137 MMHG | OXYGEN SATURATION: 98 % | RESPIRATION RATE: 18 BRPM | WEIGHT: 120 LBS | TEMPERATURE: 98.7 F | HEIGHT: 60 IN | BODY MASS INDEX: 23.56 KG/M2 | DIASTOLIC BLOOD PRESSURE: 87 MMHG

## 2022-01-06 DIAGNOSIS — M43.16 OTHER INTERVERTEBRAL DISC DEGENERATION, LUMBAR REGION: ICD-10-CM

## 2022-01-06 DIAGNOSIS — M48.07 SPINAL STENOSIS, LUMBOSACRAL REGION: ICD-10-CM

## 2022-01-06 DIAGNOSIS — M51.36 OTHER INTERVERTEBRAL DISC DEGENERATION, LUMBAR REGION: ICD-10-CM

## 2022-01-06 DIAGNOSIS — M54.16 RADICULOPATHY, LUMBAR REGION: ICD-10-CM

## 2022-01-06 DIAGNOSIS — M43.16 SPONDYLOLISTHESIS, LUMBAR REGION: ICD-10-CM

## 2022-01-06 PROCEDURE — 99205 OFFICE O/P NEW HI 60 MIN: CPT

## 2022-01-06 PROCEDURE — 72114 X-RAY EXAM L-S SPINE BENDING: CPT | Mod: 26

## 2022-01-06 PROCEDURE — 72114 X-RAY EXAM L-S SPINE BENDING: CPT

## 2022-01-06 NOTE — REASON FOR VISIT
[Initial Visit] : an initial visit for [Back Pain] : back pain [Spinal Stenosis] : spinal stenosis [Spondylolistheses] : spondylolistheses

## 2022-01-06 NOTE — DISCUSSION/SUMMARY
[de-identified] : The patient has a spondylolisthesis with facet arthropathy and central stenosis at L4-5 she has not completed conservative therapy and I recommend physical therapy aerobic exercise as well as epidural steroid injections she is also a current smoker and I recommend quitting smoking prior to any fusion operation we discussed the risk benefits and alternatives of surgery and answered all of her questions to the best my ability she is can speak to her primary care doctor and speak to her pain medicine doctor and we will proceed with conservative therapy at this time I made her a video in our [facility] system and answered all her questions to the best my ability.\par \par Lars Collins M.D., M.Sc.\par \par Department of Neurosurgery\par [facility] [place] [facility]\par [facility]\par [facility]\par [place], [place]\par [email or web address] [phone]

## 2022-01-06 NOTE — HISTORY OF PRESENT ILLNESS
[de-identified] : The patient is a 56-year-old woman who is the ex-wife of one of my other patients she has had a longstanding history of back pain which has worsened over the last few weeks and months she has been under the care of one of our fellow neurologist and pain management doctors who ordered an MRI and once the results came back she was recommended to see a surgeon she has difficulty walking more than 3 blocks in sneakers and has difficulty walking more than half block in flip-flops she says that she walks very slow and she feels tired and has to stop she has pain most of the day but is able to work out several times with a  during the week

## 2022-01-07 ENCOUNTER — NON-APPOINTMENT (OUTPATIENT)
Age: 57
End: 2022-01-07

## 2022-01-12 ENCOUNTER — APPOINTMENT (OUTPATIENT)
Dept: OTOLARYNGOLOGY | Facility: CLINIC | Age: 57
End: 2022-01-12
Payer: COMMERCIAL

## 2022-01-12 VITALS — BODY MASS INDEX: 20.49 KG/M2 | WEIGHT: 120 LBS | TEMPERATURE: 97.1 F | HEIGHT: 64 IN

## 2022-01-12 PROCEDURE — 92557 COMPREHENSIVE HEARING TEST: CPT

## 2022-01-12 PROCEDURE — 99214 OFFICE O/P EST MOD 30 MIN: CPT

## 2022-01-12 PROCEDURE — 92567 TYMPANOMETRY: CPT

## 2022-01-12 NOTE — CONSULT LETTER
[Dear  ___] : Dear  [unfilled], [Courtesy Letter:] : I had the pleasure of seeing your patient, [unfilled], in my office today. [Please see my note below.] : Please see my note below. [Consult Closing:] : Thank you very much for allowing me to participate in the care of this patient.  If you have any questions, please do not hesitate to contact me. [Sincerely,] : Sincerely, [FreeTextEntry3] : Maria Eugenia Parham MD\par

## 2022-01-12 NOTE — HISTORY OF PRESENT ILLNESS
[de-identified] : MARTHA SEALS is a 56 year patient with a history of chronic rhinitis, reflux and eustachian tube dysfunction. She has not had recurrent ear, neck or facial pain. The left nasal congestion is also better. She has had mild eustachian tube dysfunction. She SCUBA dives and would like to make sure the ears are okay. She has no other ear symptoms but may have hearing loss.  She is able to equalize the pressure in her ears when diving. She flew recently and had mild right ear symptoms. she is undergoing injections for spinal stenosis.  \par \par She is also due for a repeat UE.  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 \par ENT History\par She has seen an ENT in the past for her sinuses. She has no history of sinus surgery\par She has a history of TMJD. She had upper and lower nightguards but has not worn them recently. She also had PT for it \par She has a history of reflux. She is on medication\par She has a history of migraines in the past. \par She has tested positive for Lyme disease \par [place] 12/2021- DNS and mild reflux related laryngeal symptoms

## 2022-01-12 NOTE — ASSESSMENT
[FreeTextEntry1] : She has been doing well from an ENT standpoint. She has not had recurrent facial or ear pain. She does some mild eustachian tube dysfunction. Her ear exam and audiogram were normal\par \par PLAN\par \par -findings and management options discussed in detail with the patient. \par -good aural hygiene\par -avoid using cotton swabs in the ears \par -noise precautions\par -annual audiogram\par -She was given literature regarding eustachian tube dysfunction. We reviewed management. She should dive slowly and equalize the ear pressure. She should not dive if she has nasal congestion and ear symptoms. Smoking cessation was also strongly recommended\par -She is going to follow up the gastroenterologist for repeat upper endoscopy\par -Continue management of reflux\par -follow up approximately 3 months if doing well\par -call and return earlier if any concerns. \par

## 2022-02-10 NOTE — REASON FOR VISIT
[Follow-Up: _____] : a [unfilled] follow-up visit Complex Repair And W Plasty Text: The defect edges were debeveled with a #15 scalpel blade.  The primary defect was closed partially with a complex linear closure.  Given the location of the remaining defect, shape of the defect and the proximity to free margins a W plasty was deemed most appropriate for complete closure of the defect.  Using a sterile surgical marker, an appropriate advancement flap was drawn incorporating the defect and placing the expected incisions within the relaxed skin tension lines where possible.    The area thus outlined was incised deep to adipose tissue with a #15 scalpel blade.  The skin margins were undermined to an appropriate distance in all directions utilizing iris scissors.

## 2022-04-05 ENCOUNTER — TRANSCRIPTION ENCOUNTER (OUTPATIENT)
Age: 57
End: 2022-04-05

## 2022-04-11 ENCOUNTER — RX RENEWAL (OUTPATIENT)
Age: 57
End: 2022-04-11

## 2022-05-11 ENCOUNTER — NON-APPOINTMENT (OUTPATIENT)
Age: 57
End: 2022-05-11

## 2022-05-25 ENCOUNTER — NON-APPOINTMENT (OUTPATIENT)
Age: 57
End: 2022-05-25

## 2022-06-02 ENCOUNTER — APPOINTMENT (OUTPATIENT)
Dept: BARIATRICS | Facility: CLINIC | Age: 57
End: 2022-06-02
Payer: COMMERCIAL

## 2022-06-02 VITALS
SYSTOLIC BLOOD PRESSURE: 156 MMHG | HEIGHT: 64 IN | WEIGHT: 122 LBS | BODY MASS INDEX: 20.83 KG/M2 | TEMPERATURE: 97.6 F | DIASTOLIC BLOOD PRESSURE: 82 MMHG | HEART RATE: 86 BPM | OXYGEN SATURATION: 96 %

## 2022-06-02 DIAGNOSIS — K42.9 UMBILICAL HERNIA W/OUT OBSTRUCTION OR GANGRENE: ICD-10-CM

## 2022-06-02 PROCEDURE — 99205 OFFICE O/P NEW HI 60 MIN: CPT

## 2022-06-14 ENCOUNTER — APPOINTMENT (OUTPATIENT)
Dept: OTOLARYNGOLOGY | Facility: CLINIC | Age: 57
End: 2022-06-14
Payer: COMMERCIAL

## 2022-06-14 VITALS — WEIGHT: 122 LBS | TEMPERATURE: 97.2 F | BODY MASS INDEX: 20.83 KG/M2 | HEIGHT: 64 IN

## 2022-06-14 DIAGNOSIS — H93.299 OTHER ABNORMAL AUDITORY PERCEPTIONS, UNSPECIFIED EAR: ICD-10-CM

## 2022-06-14 PROCEDURE — 92550 TYMPANOMETRY & REFLEX THRESH: CPT

## 2022-06-14 PROCEDURE — 99213 OFFICE O/P EST LOW 20 MIN: CPT

## 2022-06-14 PROCEDURE — 92552 PURE TONE AUDIOMETRY AIR: CPT

## 2022-06-14 NOTE — HISTORY OF PRESENT ILLNESS
[de-identified] : MARTHA SEALS is a 57 year old patient here for follow-up.  She has a history of chronic rhinitis, reflux, and eustachian tube dysfunction.  She is concerned that she could have hearing loss.  She said on her physical exam, she was not hearing as well from one ear.  She has not noticed a change in the hearing otherwise.  She said that she sometimes has drainage from the ears.  She occasionally uses Q-tips.  She has no otalgia.  She has not flown or gone scuba diving recently.\par \par She has occasional nasal congestion but no other sinus symptoms.  She has no throat symptoms.  She did have her follow-up upper endoscopy.  She is on a PPI.  She is also going work-up for abdominal pain.  Unfortunately she continues to smoke.  She is trying to quit.\par \par ENT History\par She has seen an ENT in the past for her sinuses. She has no history of sinus surgery\par She has a history of TMJD. She had upper and lower nightguards but has not worn them recently. She also had PT for it \par She has a history of reflux. She is on medication. she had a repeat UE\par She has a history of migraines in the past. \par She has tested positive for Lyme disease \par [place] 12/2021- DNS and mild reflux related laryngeal symptoms

## 2022-06-14 NOTE — ASSESSMENT
[FreeTextEntry1] : Her ears were normal on exam.  Audiogram showed normal hearing.  She does have mild eustachian tube dysfunction.  Her tympanograms were normal but the pressures were negative.\par \par She has a history of reflux.  She is undergoing work-up for abdominal pain\par \par PLAN\par \par -findings and management options discussed in detail with the patient. \par -good aural hygiene\par -avoid using cotton swabs in the ears \par -noise precautions\par -annual audiogram\par -Nasal steroid spray as needed.  She is unable to take antihistamines\par -Smoking cessation again recommended.  She said she is on medication to try and quit smoking\par -Continue with treatment for reflux\par -Follow-up in 6 months if she is doing well\par -call and return earlier if any concerns. \par

## 2023-02-14 ENCOUNTER — NON-APPOINTMENT (OUTPATIENT)
Age: 58
End: 2023-02-14

## 2023-03-06 ENCOUNTER — APPOINTMENT (OUTPATIENT)
Dept: OTOLARYNGOLOGY | Facility: CLINIC | Age: 58
End: 2023-03-06
Payer: COMMERCIAL

## 2023-03-06 VITALS — HEIGHT: 64 IN | BODY MASS INDEX: 20.83 KG/M2 | WEIGHT: 122 LBS

## 2023-03-06 DIAGNOSIS — H93.291 OTHER ABNORMAL AUDITORY PERCEPTIONS, RIGHT EAR: ICD-10-CM

## 2023-03-06 PROCEDURE — 31575 DIAGNOSTIC LARYNGOSCOPY: CPT

## 2023-03-06 PROCEDURE — 99213 OFFICE O/P EST LOW 20 MIN: CPT | Mod: 25

## 2023-03-06 RX ORDER — CELECOXIB 100 MG/1
100 CAPSULE ORAL
Qty: 180 | Refills: 0 | Status: DISCONTINUED | COMMUNITY
Start: 2021-10-14 | End: 2023-03-06

## 2023-03-06 RX ORDER — COLD-HOT PACK
EACH MISCELLANEOUS
Refills: 0 | Status: ACTIVE | COMMUNITY

## 2023-03-06 RX ORDER — ASCORBIC ACID 500 MG
TABLET ORAL
Refills: 0 | Status: ACTIVE | COMMUNITY

## 2023-03-06 RX ORDER — CHROMIUM 200 MCG
TABLET ORAL
Refills: 0 | Status: ACTIVE | COMMUNITY

## 2023-03-06 RX ORDER — BUPROPION HYDROCHLORIDE 150 MG/1
150 TABLET, FILM COATED, EXTENDED RELEASE ORAL
Refills: 0 | Status: ACTIVE | COMMUNITY

## 2023-03-06 NOTE — HISTORY OF PRESENT ILLNESS
[de-identified] : MARTHA SEALS is a 57 year old patient here for right ear fullness, drainage, and sensation of eustachian tube dysfunction.  She still uses Q-tips.  She has no otalgia, tinnitus, or dizziness.  She unfortunately continues to smoke.  She did under go GI evaluation for abdominal pain.  She stopped the PPI and is on Tagamet.  She said that is better.  She could be better about following reflux precautions\par \par ENT History\par She has seen an ENT in the past for her sinuses. She has no history of sinus surgery\par She has a history of TMJD. She had upper and lower nightguards but has not worn them recently. She also had PT for it \par She has a history of reflux. She is on medication. she had a repeat UE\par She has a history of migraines in the past. \par She has tested positive for Lyme disease \par  \par

## 2023-03-06 NOTE — CONSULT LETTER
Internal Medicine Daily Progress Note    Patient: Haydee Taylor 57 year old female Date: 4/23/2020   YOB: 1963 Attending: Misha Kevin MD     Chief Complaint:  Patient seen and examined.  Doing well today.  No new complaints at this time.  We discussed about increasing her oral diet.  Patient is barely eating anything.  Currently on TPN.  Can have a regular diet with patient does not like the taste.      Hospital Course:   Ms. Taylor is a 57 year old female with history of EtOH abuse admitted to [place] 4/1 with perforated gastric ulcer, hemorrhagic shock and intra-abdominal infection.    She had abdominal pain for 2 weeks PTA and was taking NSAIDS for that time frame. No alcohol for those two weeks.  She was found to have perforated gastric ulcer, hemorrhagic shock and intra-abdominal infection.  She underwent oversew and Jerel patch 04/01, and was treated with Cipro, Flagyl and fluconazole. Postoperatively she had an ileus and bilious drainage from her PADDY concerning for leak managed conservatively. With concern for peak restarted on antibiotics and antifungal with Aztreonam, Flagyl and Micafungin on 4/8. Repeat CT abdomen/pelvis on 4/15 with increase in size of a large, anterior mesenteric air/fluid collection had IR drain placed on 4/17 with smear showing rare gram positive bacilli, gram positive cocci, and rare yeast.     Had L foot drop since admission to [place], which progressed to a pale foot with cyanotic toes secondary to thrombus. On 4/8 she was transferred to [facility] for evaluation of ischemic limb. LLE doppler showed L distal SFA  Near occlusion, occlusion of the AT and tibioperoneal trunk.  She is s/p IR angioplasty status post embolectomy, and stent on 4/9 with significant improvement in her circulation.     On 4/7 she abruptly became dyspneic with increasing O2 requirements which was felt to be volume overload.  4/9 with increasing oxygen needs and PADDY drainage changed to bilious.  CT C/A/P with oral contrast, oral contrast leaking from PADDY. CT chest with bilateral groundglass opacities, COVID negative. Hypoxemia improving with some lasix.     Subjective:   Denies any abdominal pain or chest pain. Tachypnic.    Review of Systems: A 12 point review of systems is otherwise negative    Medications:  Scheduled  • ciprofloxacin  500 mg Oral BID Abreakfast & HS   • metroNIDAZOLE  500 mg Oral TID   • fluconazole  200 mg Oral Daily   • pantoprazole  40 mg Oral 2 times per day   • nicotine  1 patch Transdermal Daily   • potassium CHLORIDE  40 mEq Oral Daily with breakfast   • clopidogrel  75 mg Oral Daily   • metoPROLOL  5 mg Intravenous 4 times per day   • insulin regular (human)   Subcutaneous 4 times per day   • sodium chloride (PF)  2 mL Intracatheter 2 times per day   • fat emulsion  250 mL Intravenous Q24H       PRN  acetaminophen, sodium chloride, alteplase, dextrose, dextrose, dextrose, glucagon, dextrose, dextrose, potassium CHLORIDE, potassium CHLORIDE, potassium CHLORIDE 20 mEq/100mL IVPB, potassium CHLORIDE, potassium CHLORIDE, potassium CHLORIDE 20 mEq/100mL IVPB, magnesium sulfate, magnesium sulfate, magnesium sulfate, ipratropium-albuterol, sodium chloride, sodium chloride, albuterol, dextrose, heparin (porcine), heparin (porcine)    Continuous infusion  • parenteral nutrition adult custom central     • parenteral nutrition adult custom central 50 mL/hr at 04/22/20 2144   • dextrose 5 % infusion     • sodium chloride 0.9% infusion 10 mL/hr at 04/15/20 2150   • dextrose 10 % infusion     • heparin (porcine) 25,000 units/250 mL in dextrose 5 % infusion 25 Units/kg/hr (04/23/20 1446)       Allergies:  ALLERGIES:   Allergen Reactions   • Penicillins ANAPHYLAXIS     Tolerated ertapenem in past   • Hydrochlorothiazide Nausea & Vomiting       Physical Examination :  Vital 24 Hour Range Most Recent Value   Temperature Temp  Min: 98.3 °F (36.8 °C)  Max: 98.6 °F (37 °C) 98.3 °F (36.8 °C)   Pulse  [Dear  ___] : Dear  [unfilled], Pulse  Min: 94  Max: 106 (!) 104   Respiratory Resp  Min: 18  Max: 20 20   Blood Pressure BP  Min: 108/69  Max: 123/73 110/60   Pulse Oximetry SpO2  Min: 96 %  Max: 98 %    O2 O2 Flow Rate (L/min)  Av L/min  Min: 4 L/min   Min taken time: 20 1210  Max: 4 L/min   Max taken time: 20 1210    Intake/Output:     Intake/Output Summary (Last 24 hours) at 2020 1514  Last data filed at 2020 1346  Gross per 24 hour   Intake 2739 ml   Output 4275 ml   Net -1536 ml     Constitutional: NAD, sitting up in chair   HENT: MMM  Neck: Supple. Has R IJ central line   Respiratory: decreased at the bases, otherwise clear   Cardiovascular:  Normal rate, normal rhythm, no murmurs, no gallops, no rubs.  GI:  Soft, nondistended, normal bowel sounds, mild tenderness to palpation around the incision. 2 PADDY drain in place. Midline incision with staples in place and c/d/i  : Nazario in place   Musculoskeletal:  No tenderness nor deformities.  Skin:  Well hydrated, no rash.  Neurologic:  Alert & oriented x 3, no focal deficits noted.  Extremities: No clubbing, cyanosis, 1+ LE edema. +left foot drop  Psychiatric: Mood and affect appropriate.    Laboratory Results:  Recent Labs   Lab 20  1304 20  0624 20  0626  20  0510  20  0712  20  0627  20  0320   WBC  --   --   --   --   --  12.6*  --  11.0  --  11.0  --  9.3   RBC  --   --   --   --   --  2.66*  --  2.86*  --  3.01*  --  2.98*   HGB  --   --   --   --   --  7.3*  --  7.6*  --  8.1*  --  7.9*   HCT  --   --   --   --   --  22.7*  --  24.8*  --  26.2*  --  26.0*   PLT  --   --   --   --   --  452*  --  504*  --  546*  --  482*   PTT 99* 38*  --  56*   < > 59*   < > 38*   < >  --    < >  --    SODIUM  --   --  130* 133*  --  128*  --  130*  --  132*  --  135   POTASSIUM  --  3.7 3.1* 3.6  3.5   < > 3.2*   < > 3.9   < > 3.7   < > 3.2*   CHLORIDE  --   --  87* 89*  --  86*  --  89*  --  91*  --  97*   CO2  --   --   [Courtesy Letter:] : I had the pleasure of seeing your patient, [unfilled], in my office today. 39* 37*  --  38*  --  39*  --  38*  --  36*   BUN  --   --  9 8  --  8  --  7  --  7  --  8   CREATININE  --   --  0.34* 0.35*  --  0.26*  --  0.29*  --  0.22*  --  0.26*   GLUCOSE  --   --  102* 170*  --  194*  --  209*  --  200*  --  154*   CALCIUM  --   --  8.1* 8.1*  --  8.2*  --  8.0*  --  8.1*  --  7.8*   MG  --   --   --   --   --   --   --  2.0  --  1.8  --  1.7   PHOS  --   --   --   --   --   --   --  3.5  --   --   --   --    AST  --   --   --   --   --  28  --  26  --  25  --   --    GPT  --   --   --   --   --  31  --  30  --  26  --   --    ALKPT  --   --   --   --   --  144*  --  144*  --  129*  --   --    BILIRUBIN  --   --   --   --   --  0.4  --  0.2  --  0.3  --   --    ALBUMIN  --   --   --   --   --  1.6*  --  1.5*  --  1.5*  --   --     < > = values in this interval not displayed.       Imaging Results Reviewed:  Ct Abdomen Pelvis W Contrast    Addendum Date:     Findings were directly discussed with Dr. Hernandez by Dr. Brian Smith on 4/15/2020 at 11:27 AM.    Result Date: 4/15/2020  CT ABDOMEN PELVIS W CONTRAST CLINICAL INFORMATION: Status post exploratory laparoscopy with Jerel patch repair of perforated gastric ulcer 4/1/2020. Had upper GI with no extravasation on 4/13/2020.. Follow-up COMPARISON:  CT chest abdomen pelvis 4/9/2020. CT abdomen pelvis 4/5/2020 and 4/1/2020. Upper GI 4/13/2020.. TECHNIQUE:    Axial images of the abdomen and pelvis; sagittal and coronal reformatted images.   IV contrast:  Isovue-300  75 mL.   Oral contrast:  Oral contrast was given.   FINDINGS: LOWER CHEST  Partially visualized, at least moderate-sized bilateral pleural effusions with associated volume loss and airspace opacification of the left lower lobe, likely representative of atelectasis. Patchy groundglass opacities within the lingula and right lower lobe bases. The heart is within normal limits for size. No significant pericardial effusion. UPPER ABDOMEN   Liver and bile ducts:  No suspicious intrahepatic  [Please see my note below.] : Please see my note below. [Consult Closing:] : Thank you very much for allowing me to participate in the care of this patient.  If you have any questions, please do not hesitate to contact me. lesion or biliary dilatation.   Gallbladder:  Mild pericholecystic fluid collection, overall similar comparison to prior exam.   Pancreas:  Normal.   Spleen:  Trace subcapsular perisplenic fluid collection.     RETROPERITONEUM   Adrenals:  Normal.   Kidneys and ureters:  Normal.   GASTROINTESTINAL TRACT and MESENTERY   Mild thickening of the gastric folds, likely associated with recent surgery. Uncomplicated duodenal diverticulosis Redemonstration of a small focal area of outpouching along the posterior second duodenal segment containing an air-fluid level, slightly less prominent in comparison to prior CT 4/9/2020 (3:56). Findings may representative the duodenal diverticula, although a contained perforation is also considered. Colonic diverticulosis. No abnormally dilated bowel. MISCELLANEOUS Right upper quadrant surgical drain with tip overlying the anterior left hepatic lobe. Interval increase in size of a central anterior mesenteric air and fluid collection measuring approximately 7.6 x 4.7 x 6.7 cm (3:69, 602:90), previously ill-defined. The gaseous aspect of this collection tracks to the right upper quadrant drain and ventral central incision. Redemonstration of peripherally enhancing perihepatic fluid collection which is slightly decreased in size in comparison to prior, currently measuring up to 0.9 cm in thickness, previously 1.4 cm. There are small foci of gas located within this collection. Small, partially enhancing, inferior left paracolic gutter collection, slightly decreased in size in comparison to prior (3:88). There is additional small amount of free fluid along the left superior pericolic gutter. Moderate mesenteric edema. Small amount of free fluid within the inferior right pericolic gutter. Scattered foci of free air within the abdomen, the majority of which is located in the aforementioned collections. No discrete abdominal lymphadenopathy. VASCULATURE   The abdominal aorta is normal in  [Sincerely,] : Sincerely, caliber.  There are atherosclerotic calcifications of the abdominal aorta.  The portal veins are patent. Postsurgical changes along the right inguinal region. PELVIS   Nazario catheter in place. The bladder is otherwise unremarkable. The uterus is present and anteflexed. Peripherally enhancing posterior cul-de-sac fluid collection which overall appears similar in size in comparison to prior CT 4/9/2020, measuring approximately 5.6 x 8.2 x 4.0 cm (3:91, 602:73). Within the left lateral aspect of this collection there are 2 peripherally enhancing ovoid structures which abut the left adnexa. Findings are of indeterminate of ovarian/fallopian tube involvement (3:94). Additionally, there is a low-density structure in the region of the right adnexa (3, 92), measuring approximately 3.1 cm (3:92). No discrete pelvic lymphadenopathy. BONES/SOFT TISSUES   Diffuse soft tissue anasarca. Ventral abdominal surgical incision. No acute osseous abnormality or suspicious osseous lesion.     Impression: 1.  Increase in size of a large, anterior mesenteric air/fluid collection which abuts the upper abdominal surgical drain and greater curve of the stomach. Although this collection currently does not contain oral contrast, a probable small amount of extravasated contrast was seen in this area on prior CT dated 4/9.  Persistent leak can not be excluded. 2.  Deep pelvic and perihepatic collections are similar in size, but demonstrate worsened peripheral enhancement concerning for infected collections. 3.  There is a small indeterminate focus of outpouching along the posterior aspect of the second duodenal segment, less conspicuous in comparison to prior examinations. Findings likely representative of a duodenal diverticulum. 4.  Moderate-sized bilateral pleural effusions with ill-defined opacities in the bilateral lower lungs. 5.  Diffuse soft tissue anasarca. I have personally reviewed the images and modified the resident's report as  [FreeTextEntry3] : Maria Eugenia Parham MD\par  necessary.      Assessment and Plan:  Ms. Taylor is a 57 year old female with history of EtOH abuse admitted to [place] 4/1 with perforated gastric ulcer, hemorrhagic shock and intra-abdominal infection.    1. Perforated gastric ulcer s/p oversew and Jerel patch on 04/01, this was complicated by suspected leak an abscess.  Status post IR drain on 04/17 2020. Now drain exchange has been done.  Antibiotics switched to oral at this time.  2. Hemorraghic shock -resolved at this time  3. Peritonitis -improved at this time  4. Hypoxia. Due to volume overload , improved at this time.  - CT C/A/P on 4/9 with oral contrast, oral contrast leaking from PADDY. Restarted antibiotics at that point.   - status post IR drainage on 4/17, smear with rare gram positive cocci, gram positive bacilli, and yeast  - on TPN and full liquid diet   - ID following for abx management. Now on ciprofloxacin, Flagyl and fluconazole.    I would decrease Lasix to 20 mg p.o. b.i.d..  6. Left foot ischemic limb -improved at this time  7. PAD  8. Left foot drop   - LLE doppler showed L distal SFA near occlusion, occlusion of the AT and tibioperoneal trunk.    - s/p IR angioplasty status post embolectomy, and stent on 4/9 with significant improvement in her circulation.   - foot drop protocol per surgery   - continue heparin gtt  - on Plavix   - plan to eventually put her on Plavix and AC (likely NOAC)    9. COVID-19 rule out. Negative     10. Hyponatremia: hypervolemic hyponatremia, weight significantly elevated. Increase lasix.    DVT/VTE Prophylaxis:  VTE Pharmacologic Prophylaxis: Yes  VTE Mechanical Prophylaxis: Yes    Code Status: Full Resuscitation    Discharge:  DARIUS Expected Discharge Date: 04/25/20  Barriers: clinical condition   Destination: [facility] vs [facility]        [facility] Hospitalist  Please contact the attending Hospitalist from 7AM until 7pm.   From 7pm to 7am please contact the Hospitalist on call

## 2023-03-06 NOTE — ASSESSMENT
[FreeTextEntry1] : She has had right ear fullness and sensation of eustachian tube dysfunction.  She had scant cerumen on the right which was removed.  Her ears were otherwise normal on exam.  She may have symptoms due to eustachian tube dysfunction, TMJ dysfunction, smoking, chronic rhinitis, and/or reflux.\par \par She has reflux.  There were no suspicious masses on flexible laryngoscopy\par \par PLAN\par \par -findings and management options discussed in detail with the patient. \par -Good ear hygiene reviewed.  I asked her to avoid using cotton swabs in the ears.\par -I recommended dry ear precautions.  She may use earplugs when she showers or consider trying swimmers eardrops\par -Monitor hearing\par -Nasal steroid spray as needed\par -Smoking cessation again strongly recommended.  I have asked her to consider trying a program to assist her with quitting\par -I recommended reflux precautions.  She was given updated literature.  She will continue take medication\par -Follow-up in 6 months\par -call and return earlier if any concerns or worsening symptoms. \par

## 2023-10-11 ENCOUNTER — APPOINTMENT (OUTPATIENT)
Dept: OTOLARYNGOLOGY | Facility: CLINIC | Age: 58
End: 2023-10-11
Payer: COMMERCIAL

## 2023-10-11 VITALS — WEIGHT: 122 LBS | BODY MASS INDEX: 20.83 KG/M2 | HEIGHT: 64 IN

## 2023-10-11 DIAGNOSIS — H69.93 UNSPECIFIED EUSTACHIAN TUBE DISORDER, BILATERAL: ICD-10-CM

## 2023-10-11 DIAGNOSIS — H93.293 OTHER ABNORMAL AUDITORY PERCEPTIONS, BILATERAL: ICD-10-CM

## 2023-10-11 DIAGNOSIS — H69.91 UNSPECIFIED EUSTACHIAN TUBE DISORDER, RIGHT EAR: ICD-10-CM

## 2023-10-11 PROCEDURE — 92567 TYMPANOMETRY: CPT

## 2023-10-11 PROCEDURE — 92557 COMPREHENSIVE HEARING TEST: CPT

## 2023-10-11 PROCEDURE — 31575 DIAGNOSTIC LARYNGOSCOPY: CPT

## 2023-10-11 PROCEDURE — 99213 OFFICE O/P EST LOW 20 MIN: CPT | Mod: 25

## 2023-10-11 RX ORDER — MELOXICAM 7.5 MG/1
7.5 TABLET ORAL
Refills: 0 | Status: DISCONTINUED | COMMUNITY
End: 2023-10-11

## 2023-10-11 RX ORDER — VERAPAMIL HYDROCHLORIDE 180 MG/1
180 CAPSULE, DELAYED RELEASE PELLETS ORAL
Qty: 90 | Refills: 3 | Status: DISCONTINUED | COMMUNITY
Start: 2019-03-14 | End: 2023-10-11

## 2023-10-12 PROBLEM — H93.293 ABNORMAL AUDITORY PERCEPTION OF BOTH EARS: Status: ACTIVE | Noted: 2023-10-12

## 2023-11-16 ENCOUNTER — OUTPATIENT (OUTPATIENT)
Dept: OUTPATIENT SERVICES | Facility: HOSPITAL | Age: 58
LOS: 1 days | End: 2023-11-16
Payer: COMMERCIAL

## 2023-11-16 ENCOUNTER — APPOINTMENT (OUTPATIENT)
Dept: CT IMAGING | Facility: HOSPITAL | Age: 58
End: 2023-11-16

## 2023-11-16 PROCEDURE — 70491 CT SOFT TISSUE NECK W/DYE: CPT

## 2023-11-16 PROCEDURE — 70486 CT MAXILLOFACIAL W/O DYE: CPT | Mod: 26

## 2023-11-16 PROCEDURE — 70486 CT MAXILLOFACIAL W/O DYE: CPT

## 2023-11-16 PROCEDURE — 70491 CT SOFT TISSUE NECK W/DYE: CPT | Mod: 26

## 2023-12-11 ENCOUNTER — APPOINTMENT (OUTPATIENT)
Dept: OTOLARYNGOLOGY | Facility: CLINIC | Age: 58
End: 2023-12-11

## 2023-12-11 ENCOUNTER — APPOINTMENT (OUTPATIENT)
Dept: OTOLARYNGOLOGY | Facility: CLINIC | Age: 58
End: 2023-12-11
Payer: COMMERCIAL

## 2023-12-11 DIAGNOSIS — H92.01 OTALGIA, RIGHT EAR: ICD-10-CM

## 2023-12-11 DIAGNOSIS — K21.9 GASTRO-ESOPHAGEAL REFLUX DISEASE W/OUT ESOPHAGITIS: ICD-10-CM

## 2023-12-11 DIAGNOSIS — J31.0 CHRONIC RHINITIS: ICD-10-CM

## 2023-12-11 DIAGNOSIS — M26.609 UNSPECIFIED TEMPOROMANDIBULAR JOINT DISORDER: ICD-10-CM

## 2023-12-11 PROCEDURE — 99442: CPT

## 2024-12-24 PROBLEM — F10.90 ALCOHOL USE: Status: ACTIVE | Noted: 2018-09-21

## 2025-02-04 ENCOUNTER — NON-APPOINTMENT (OUTPATIENT)
Age: 60
End: 2025-02-04

## 2025-04-02 ENCOUNTER — NON-APPOINTMENT (OUTPATIENT)
Age: 60
End: 2025-04-02

## 2025-04-02 ENCOUNTER — APPOINTMENT (OUTPATIENT)
Dept: OTOLARYNGOLOGY | Facility: CLINIC | Age: 60
End: 2025-04-02
Payer: COMMERCIAL

## 2025-04-02 DIAGNOSIS — K21.9 GASTRO-ESOPHAGEAL REFLUX DISEASE W/OUT ESOPHAGITIS: ICD-10-CM

## 2025-04-02 DIAGNOSIS — J34.3 HYPERTROPHY OF NASAL TURBINATES: ICD-10-CM

## 2025-04-02 DIAGNOSIS — J34.829 NASAL VALVE COLLAPSE, UNSPECIFIED: ICD-10-CM

## 2025-04-02 DIAGNOSIS — J34.2 DEVIATED NASAL SEPTUM: ICD-10-CM

## 2025-04-02 DIAGNOSIS — M26.609 UNSPECIFIED TEMPOROMANDIBULAR JOINT DISORDER: ICD-10-CM

## 2025-04-02 DIAGNOSIS — J31.0 CHRONIC RHINITIS: ICD-10-CM

## 2025-04-02 PROCEDURE — 31231 NASAL ENDOSCOPY DX: CPT

## 2025-04-02 PROCEDURE — 99213 OFFICE O/P EST LOW 20 MIN: CPT | Mod: 25

## 2025-04-02 RX ORDER — BUPROPION HYDROCHLORIDE 75 MG/1
TABLET, FILM COATED ORAL
Refills: 0 | Status: ACTIVE | COMMUNITY

## 2025-08-14 ENCOUNTER — APPOINTMENT (OUTPATIENT)
Dept: ORTHOPEDIC SURGERY | Facility: CLINIC | Age: 60
End: 2025-08-14
Payer: COMMERCIAL

## 2025-08-14 VITALS
HEART RATE: 98 BPM | HEIGHT: 64 IN | DIASTOLIC BLOOD PRESSURE: 85 MMHG | WEIGHT: 126 LBS | SYSTOLIC BLOOD PRESSURE: 138 MMHG | OXYGEN SATURATION: 97 % | BODY MASS INDEX: 21.51 KG/M2

## 2025-08-14 DIAGNOSIS — M54.12 RADICULOPATHY, CERVICAL REGION: ICD-10-CM

## 2025-08-14 PROCEDURE — 99203 OFFICE O/P NEW LOW 30 MIN: CPT

## 2025-08-14 RX ORDER — METHYLPREDNISOLONE 4 MG/1
4 TABLET ORAL
Qty: 1 | Refills: 0 | Status: ACTIVE | COMMUNITY
Start: 2025-08-14 | End: 1900-01-01

## 2025-08-27 ENCOUNTER — RESULT REVIEW (OUTPATIENT)
Age: 60
End: 2025-08-27

## 2025-08-27 ENCOUNTER — APPOINTMENT (OUTPATIENT)
Dept: SPINE | Facility: CLINIC | Age: 60
End: 2025-08-27

## 2025-08-27 ENCOUNTER — OUTPATIENT (OUTPATIENT)
Dept: OUTPATIENT SERVICES | Facility: HOSPITAL | Age: 60
LOS: 1 days | End: 2025-08-27
Payer: COMMERCIAL

## 2025-08-27 VITALS
OXYGEN SATURATION: 98 % | DIASTOLIC BLOOD PRESSURE: 81 MMHG | SYSTOLIC BLOOD PRESSURE: 138 MMHG | RESPIRATION RATE: 18 BRPM | WEIGHT: 127 LBS | BODY MASS INDEX: 21.68 KG/M2 | HEART RATE: 92 BPM | HEIGHT: 64 IN

## 2025-08-27 DIAGNOSIS — M48.062 SPINAL STENOSIS, LUMBAR REGION WITH NEUROGENIC CLAUDICATION: ICD-10-CM

## 2025-08-27 PROCEDURE — 99203 OFFICE O/P NEW LOW 30 MIN: CPT

## 2025-08-27 PROCEDURE — 72083 X-RAY EXAM ENTIRE SPI 4/5 VW: CPT | Mod: 26

## 2025-08-27 PROCEDURE — 72052 X-RAY EXAM NECK SPINE 6/>VWS: CPT | Mod: 26,59

## 2025-09-16 ENCOUNTER — APPOINTMENT (OUTPATIENT)
Dept: SPINE | Facility: CLINIC | Age: 60
End: 2025-09-16

## 2025-09-16 DIAGNOSIS — M48.062 SPINAL STENOSIS, LUMBAR REGION WITH NEUROGENIC CLAUDICATION: ICD-10-CM
